# Patient Record
Sex: MALE | Race: WHITE | NOT HISPANIC OR LATINO | Employment: OTHER | URBAN - NONMETROPOLITAN AREA
[De-identification: names, ages, dates, MRNs, and addresses within clinical notes are randomized per-mention and may not be internally consistent; named-entity substitution may affect disease eponyms.]

---

## 2022-12-02 ENCOUNTER — TELEPHONE (OUTPATIENT)
Dept: CARDIOLOGY | Facility: CLINIC | Age: 62
End: 2022-12-02

## 2022-12-02 NOTE — TELEPHONE ENCOUNTER
For the HUB to read to pt:       LEFT VM TO CONFIRM APPT, IF PT CALLS BACK PLEASE PUT CALL THROUGH

## 2023-01-20 ENCOUNTER — TELEPHONE (OUTPATIENT)
Dept: CARDIOLOGY | Facility: CLINIC | Age: 63
End: 2023-01-20
Payer: MEDICARE

## 2023-01-20 NOTE — TELEPHONE ENCOUNTER
For the HUB to read to pt:       LEFT MESSAGE FOR PT TO CONFIRM APPT, IF PT CALLS BACK PLEASE PUT CALL THROUGH, THANK YOU

## 2023-02-28 ENCOUNTER — TELEPHONE (OUTPATIENT)
Dept: CARDIOLOGY | Facility: CLINIC | Age: 63
End: 2023-02-28
Payer: MEDICARE

## 2023-03-01 ENCOUNTER — OFFICE VISIT (OUTPATIENT)
Dept: CARDIOLOGY | Facility: CLINIC | Age: 63
End: 2023-03-01
Payer: MEDICARE

## 2023-03-01 VITALS
SYSTOLIC BLOOD PRESSURE: 143 MMHG | HEIGHT: 66 IN | OXYGEN SATURATION: 96 % | WEIGHT: 186 LBS | BODY MASS INDEX: 29.89 KG/M2 | DIASTOLIC BLOOD PRESSURE: 93 MMHG | HEART RATE: 79 BPM

## 2023-03-01 DIAGNOSIS — R29.818 NEUROLOGIC ABNORMALITY: ICD-10-CM

## 2023-03-01 DIAGNOSIS — R42 DIZZINESS: ICD-10-CM

## 2023-03-01 DIAGNOSIS — R06.02 SOB (SHORTNESS OF BREATH): Primary | ICD-10-CM

## 2023-03-01 DIAGNOSIS — R53.82 CHRONIC FATIGUE: ICD-10-CM

## 2023-03-01 DIAGNOSIS — R94.31 ABNORMAL EKG: ICD-10-CM

## 2023-03-01 DIAGNOSIS — R00.2 PALPITATIONS: ICD-10-CM

## 2023-03-01 PROCEDURE — 93000 ELECTROCARDIOGRAM COMPLETE: CPT | Performed by: NURSE PRACTITIONER

## 2023-03-01 PROCEDURE — 99204 OFFICE O/P NEW MOD 45 MIN: CPT | Performed by: NURSE PRACTITIONER

## 2023-03-01 RX ORDER — FLUOXETINE HYDROCHLORIDE 20 MG/1
1 CAPSULE ORAL DAILY
COMMUNITY
Start: 2022-12-21

## 2023-03-01 RX ORDER — METFORMIN HYDROCHLORIDE 500 MG/1
1 TABLET, EXTENDED RELEASE ORAL EVERY 12 HOURS SCHEDULED
COMMUNITY
Start: 2023-01-21

## 2023-03-01 RX ORDER — NEBIVOLOL 5 MG/1
1 TABLET ORAL DAILY
COMMUNITY
Start: 2023-01-21

## 2023-03-01 RX ORDER — NITROGLYCERIN 0.4 MG/1
TABLET SUBLINGUAL
Qty: 30 TABLET | Refills: 5 | Status: SHIPPED | OUTPATIENT
Start: 2023-03-01

## 2023-03-01 RX ORDER — ATORVASTATIN CALCIUM 10 MG/1
10 TABLET, FILM COATED ORAL DAILY
COMMUNITY

## 2023-03-01 NOTE — PROGRESS NOTES
Pablo Armstrong is a 62 y.o. male who presents to day for Shortness of Breath and Fatigue (Chronic ).    CHIEF COMPLIANT  Chief Complaint   Patient presents with   • Shortness of Breath   • Fatigue     Chronic        Active Problems:  Problem List Items Addressed This Visit    None  Visit Diagnoses     SOB (shortness of breath)    -  Primary    Relevant Medications    nitroglycerin (NITROSTAT) 0.4 MG SL tablet    Other Relevant Orders    ECG 12 Lead    Stress Test With Myocardial Perfusion One Day    Adult Transthoracic Echo Complete W/ Cont if Necessary Per Protocol    Duplex Carotid Ultrasound CAR    CT Head Without Contrast    Palpitations        Relevant Medications    nitroglycerin (NITROSTAT) 0.4 MG SL tablet    Other Relevant Orders    ECG 12 Lead    Stress Test With Myocardial Perfusion One Day    Adult Transthoracic Echo Complete W/ Cont if Necessary Per Protocol    Duplex Carotid Ultrasound CAR    CT Head Without Contrast    Chronic fatigue        Relevant Medications    nitroglycerin (NITROSTAT) 0.4 MG SL tablet    Other Relevant Orders    ECG 12 Lead    Stress Test With Myocardial Perfusion One Day    Adult Transthoracic Echo Complete W/ Cont if Necessary Per Protocol    Duplex Carotid Ultrasound CAR    CT Head Without Contrast    Neurologic abnormality        Relevant Medications    nitroglycerin (NITROSTAT) 0.4 MG SL tablet    Other Relevant Orders    ECG 12 Lead    Stress Test With Myocardial Perfusion One Day    Adult Transthoracic Echo Complete W/ Cont if Necessary Per Protocol    Duplex Carotid Ultrasound CAR    CT Head Without Contrast    Abnormal EKG        Relevant Medications    nitroglycerin (NITROSTAT) 0.4 MG SL tablet    Other Relevant Orders    ECG 12 Lead    Stress Test With Myocardial Perfusion One Day    Adult Transthoracic Echo Complete W/ Cont if Necessary Per Protocol    Duplex Carotid Ultrasound CAR    CT Head Without Contrast          HPI  HPI    Mr. Rahat Armstrong is a  62-year-old male who presents today to establish care for cardiac evaluation. He is accompanied by his wife.    His primary care provider, Jonah Ashley PA-C, completed blood work on 10/10/2022 which demonstrated an LDL of 155 mg/dL, triglycerides of 200 mg/dL, elevated blood glucose, and hemoglobin A1c of 7.0 percent. The patient was prescribed atorvastatin but is previously tolerating.  He is currently taking atorvastatin 10 mg daily and tolerating this well. The patient reports constant generalized soreness, which he attributes to arthritis, and he denies that his symptoms have worsened with atorvastatin. He denies undergoing repeat lipid panel since starting anti-hyperlipidemic medication. The patient's wife believes that he is scheduled to follow up with Jonah Ashley PA-C, in the near future.    The patient takes Bystolic for a history of tachycardia. He denies that his blood pressure was significantly elevated at that time. His blood pressure is elevated in the office today, 03/01/2023, which his wife attributes to the patient drinking alcohol on the evening of 02/28/2023. She states that he drinks alcohol excessively. The patient reports that at home, his blood pressure is approximately 128 mmHg systolic and 87 mmHg or ?(please verify 85 vs. 95 mmHg) diastolic.     The patient's wife reports that the patient has diabetes mellitus and does not follow a diabetic diet. She states that his blood glucose has generally been over 200 mg/dL recently. He notes that his blood glucose fluctuates and may be 140 mg/dL on some days and 240 mg/dL on other days.    The patient experiences dyspnea consistently with exertion and occasionally at rest. His dyspnea is exacerbated by activity such as utilizing a chainsaw or ascending 15 to 20 stairs. The patient denies orthopnea; however, his wife reports that the patient experiences orthopnea. He affirms that he would be able to ambulate on a treadmill.    The patient reports that  "he \"pulled a muscle\" approximately 2 weeks ago and is experiencing resultant left neck and left shoulder pain. He was initially unable to turn his head due to neck tightness. The patient notes that intermittent tingling of his left hand began approximately 2 weeks ago, which he attributes to \"tendinitis.\" He does not recall what he was doing at the onset of his left hand tingling. His left hand tingling is exacerbated by squeezing, such as carrying a paint bucket. The patient's wife states that the patient's left upper extremity has been \"going numb,\" though the patient indicates only his left hand is affected.    Approximately 1 month ago, the patient was leaning forward and straining to look under the hong of a Jeep at night, and he dropped a flashlight. He bent forward to  the flashlight and was unable to reach or grasp the flashlight in 5 attempts. He stood up, and his bilateral lower extremities \"wouldn't move.\" The patient states that his \"brain was telling them to move, but they wouldn't move.\" When he was able to move his bilateral lower extremities, he felt as though he was dragging a toe and thought he might fall. He ambulated approximately 10 feet and sat on a golf cart. His symptoms lasted for approximately 10 to 15 minutes. He denies attempting to speak during the episode. He denies unilateral symptoms. After the episode, he was able to call his wife. The patient suspects that he \"pinched a nerve\" upon bending forward. His wife questions if he suffered a transient ischemic attack and inquires about testing. His wife denies that the patient has followed up with Jonah Ashley PA-C, or undergone CT scan of the head since the episode.    The patient denies noticing significant palpitations regularly, though he has noted occasional palpitations in the past. His wife indicates that he has reported palpitations to her.    The patient denies chest pain, chest pressure, chest heaviness, chest tightness, " "and chest discomfort.The patient's wife states that he has osteoporosis and rheumatoid arthritis.         Approximately 10 years ago, the patient suffered a \"burning\" allergic reaction to DYE administered for \"some kind of scan.\" The patient suspects that he is allergic to DUST. He states that he has been wheezing recently which he attributes to \"ROCK DUST\" and \"TREE DUST.\"    His wife notes that in  he developed a rash, pruritus, and emesis. He visited a [facility], and an electrocardiogram was performed and \"good.\" The cause of the rash was unknown. He felt improved approximately 1 hour after emesis.    Today the patient's electrocardiogram demonstrated T wave inversion and mild ST depression in leads III and aVF.    The patient's wife notes that he is a smoker and inquires smoking cessation assistance. He tried Chantix in the past and experienced nightmares. He retired 1 to 2 years ago and returned to work part-time in .    The patient's wife reports that the patient's mother underwent open heart surgeries, stent placements, and  at age 83 years. Her cardiac issues were diagnosed after the age of 60 years. She had osteoporosis.    PRIOR MEDS  Current Outpatient Medications on File Prior to Visit   Medication Sig Dispense Refill   • atorvastatin (LIPITOR) 10 MG tablet Take 1 tablet by mouth Daily.     • FLUoxetine (PROzac) 20 MG capsule Take 1 capsule by mouth Daily.     • metFORMIN ER (GLUCOPHAGE-XR) 500 MG 24 hr tablet Take 1 tablet by mouth Every 12 (Twelve) Hours.     • nebivolol (BYSTOLIC) 5 MG tablet Take 1 tablet by mouth Daily.       No current facility-administered medications on file prior to visit.       ALLERGIES  Patient has no known allergies.    HISTORY  Past Medical History:   Diagnosis Date   • Arthritis    • Depression    • Diabetes (HCC)    • Diverticulitis    • Gout    • Osteoporosis        Social History     Socioeconomic History   • Marital status:    Tobacco Use   • " "Smoking status: Every Day     Packs/day: 1.00     Years: 48.00     Pack years: 48.00     Types: Cigarettes   • Smokeless tobacco: Never   Substance and Sexual Activity   • Alcohol use: Yes     Alcohol/week: 6.0 - 10.0 standard drinks     Types: 6 - 10 Cans of beer per week       Family History   Problem Relation Age of Onset   • Heart attack Mother    • Cancer Father        Review of Systems   Constitutional: Positive for fatigue. Negative for appetite change, chills and fever.   HENT: Positive for tinnitus. Negative for dental problem, drooling, ear discharge, ear pain, facial swelling, sinus pressure, sneezing and sore throat.    Eyes: Negative for pain, redness and visual disturbance.   Respiratory: Positive for shortness of breath and wheezing. Negative for cough and choking.    Cardiovascular: Positive for chest pain and palpitations. Negative for leg swelling.   Gastrointestinal: Negative for blood in stool, constipation, diarrhea, nausea and rectal pain.   Genitourinary: Negative.  Negative for difficulty urinating.   Musculoskeletal: Positive for arthralgias.   Skin: Negative for rash and wound.   Neurological: Positive for numbness. Negative for dizziness and weakness. Syncope: left hand goes numb.   Psychiatric/Behavioral: Positive for sleep disturbance.       Objective     VITALS: /93   Pulse 79   Ht 167.6 cm (66\")   Wt 84.4 kg (186 lb)   SpO2 96%   BMI 30.02 kg/m²     LABS:   Lab Results (most recent)     None          IMAGING:   No Images in the past 120 days found..    EXAM:  Physical Exam  Vitals and nursing note reviewed.   Constitutional:       Appearance: He is well-developed.   HENT:      Head: Normocephalic and atraumatic.   Eyes:      Pupils: Pupils are equal, round, and reactive to light.   Neck:      Vascular: No carotid bruit or JVD.   Cardiovascular:      Rate and Rhythm: Normal rate and regular rhythm.      Pulses:           Carotid pulses are 2+ on the right side and 2+ on the " left side.       Radial pulses are 2+ on the right side and 2+ on the left side.        Posterior tibial pulses are 2+ on the right side and 2+ on the left side.      Heart sounds: Normal heart sounds. No murmur heard.    No gallop.   Pulmonary:      Effort: Pulmonary effort is normal. No respiratory distress.      Breath sounds: Wheezing present.   Abdominal:      General: Bowel sounds are normal. There is no distension.      Palpations: Abdomen is soft.      Tenderness: There is no abdominal tenderness.   Musculoskeletal:         General: No swelling. Normal range of motion.      Cervical back: Neck supple.   Skin:     General: Skin is warm and dry.   Neurological:      Mental Status: He is alert and oriented to person, place, and time.      Cranial Nerves: No cranial nerve deficit.      Sensory: No sensory deficit.   Psychiatric:         Speech: Speech normal.         Behavior: Behavior normal.         Thought Content: Thought content normal.         Judgment: Judgment normal.         Procedure     ECG 12 Lead    Date/Time: 3/1/2023 2:00 PM  Performed by: Will Mercado APRN  Authorized by: Will Mercado APRN   Comparison: not compared with previous ECG   Previous ECG: no previous ECG available  Rhythm: sinus rhythm  Rate: normal  BPM: 75  T inversion: II and III  QRS axis: right    Clinical impression: abnormal EKG  Comments: QTc 417 ms                 Assessment & Plan    Diagnosis Plan   1. SOB (shortness of breath)  ECG 12 Lead    Stress Test With Myocardial Perfusion One Day    Adult Transthoracic Echo Complete W/ Cont if Necessary Per Protocol    Duplex Carotid Ultrasound CAR    nitroglycerin (NITROSTAT) 0.4 MG SL tablet    CT Head Without Contrast      2. Palpitations  ECG 12 Lead    Stress Test With Myocardial Perfusion One Day    Adult Transthoracic Echo Complete W/ Cont if Necessary Per Protocol    Duplex Carotid Ultrasound CAR    nitroglycerin (NITROSTAT) 0.4 MG SL tablet    CT Head Without  Contrast      3. Chronic fatigue  ECG 12 Lead    Stress Test With Myocardial Perfusion One Day    Adult Transthoracic Echo Complete W/ Cont if Necessary Per Protocol    Duplex Carotid Ultrasound CAR    nitroglycerin (NITROSTAT) 0.4 MG SL tablet    CT Head Without Contrast      4. Neurologic abnormality  ECG 12 Lead    Stress Test With Myocardial Perfusion One Day    Adult Transthoracic Echo Complete W/ Cont if Necessary Per Protocol    Duplex Carotid Ultrasound CAR    nitroglycerin (NITROSTAT) 0.4 MG SL tablet    CT Head Without Contrast      5. Abnormal EKG  ECG 12 Lead    Stress Test With Myocardial Perfusion One Day    Adult Transthoracic Echo Complete W/ Cont if Necessary Per Protocol    Duplex Carotid Ultrasound CAR    nitroglycerin (NITROSTAT) 0.4 MG SL tablet    CT Head Without Contrast          Return in about 3 months (around 6/1/2023), or if symptoms worsen or fail to improve.    Diagnoses and all orders for this visit:    1. SOB (shortness of breath) (Primary)  -     ECG 12 Lead  -     Stress Test With Myocardial Perfusion One Day; Future  -     Adult Transthoracic Echo Complete W/ Cont if Necessary Per Protocol; Future  -     Duplex Carotid Ultrasound CAR; Future  -     nitroglycerin (NITROSTAT) 0.4 MG SL tablet; 1 under the tongue as needed for angina, may repeat q5mins for up three doses  Dispense: 30 tablet; Refill: 5  -     CT Head Without Contrast; Future    2. Palpitations  -     ECG 12 Lead  -     Stress Test With Myocardial Perfusion One Day; Future  -     Adult Transthoracic Echo Complete W/ Cont if Necessary Per Protocol; Future  -     Duplex Carotid Ultrasound CAR; Future  -     nitroglycerin (NITROSTAT) 0.4 MG SL tablet; 1 under the tongue as needed for angina, may repeat q5mins for up three doses  Dispense: 30 tablet; Refill: 5  -     CT Head Without Contrast; Future    3. Chronic fatigue  -     ECG 12 Lead  -     Stress Test With Myocardial Perfusion One Day; Future  -     Adult  Transthoracic Echo Complete W/ Cont if Necessary Per Protocol; Future  -     Duplex Carotid Ultrasound CAR; Future  -     nitroglycerin (NITROSTAT) 0.4 MG SL tablet; 1 under the tongue as needed for angina, may repeat q5mins for up three doses  Dispense: 30 tablet; Refill: 5  -     CT Head Without Contrast; Future    4. Neurologic abnormality  -     ECG 12 Lead  -     Stress Test With Myocardial Perfusion One Day; Future  -     Adult Transthoracic Echo Complete W/ Cont if Necessary Per Protocol; Future  -     Duplex Carotid Ultrasound CAR; Future  -     nitroglycerin (NITROSTAT) 0.4 MG SL tablet; 1 under the tongue as needed for angina, may repeat q5mins for up three doses  Dispense: 30 tablet; Refill: 5  -     CT Head Without Contrast; Future    5. Abnormal EKG  -     ECG 12 Lead  -     Stress Test With Myocardial Perfusion One Day; Future  -     Adult Transthoracic Echo Complete W/ Cont if Necessary Per Protocol; Future  -     Duplex Carotid Ultrasound CAR; Future  -     nitroglycerin (NITROSTAT) 0.4 MG SL tablet; 1 under the tongue as needed for angina, may repeat q5mins for up three doses  Dispense: 30 tablet; Refill: 5  -     CT Head Without Contrast; Future    Plan  1. Dietary changes to address hyperlipidemia were discussed.  2. The patient's left neck and left shoulder pain and episode of bilateral lower extremity abnormality could be due to a musculoskeletal, neurologic, or cardiac issue.  And of the most concerning is his abnormal EKG with changes that could be consistent with ischemia in the inferior leads.  Expedited treadmill stress test with myocardial perfusion.  3.  Patient will also go under echocardiogram for further evaluation of his symptoms as well and to evaluate systolic and diastolic function and evaluate for hypertensive heart disease.  4.  Bilateral carotid artery duplex ultrasound, and CT scan of the head will be performed for further evaluation, and he will be contacted to discuss the  results and further followup. If his CT scan of the head is normal, brain MRI may be considered.  5. Smoking cessation aids including nicotine patches, nicotine gums, and medications such as Wellbutrin were discussed. He will contact the office if he is interested in trialing Wellbutrin.  6.  He will continue atorvastatin 10 mg daily for the time being. Depending upon the results of his next lipid panel, atorvastatin may be increased to 20 mg daily. If he does not have followup scheduled with Jonah Ashley PA-C, in the near future, lipid panel may be ordered by this practice. His LDL goal is 100 mg/dL or less.  7.  Patient's blood pressure is controlled on current blood pressure medication regimen despite elevated blood pressure today.  Reports blood pressure is normally about 128/87.  No medication changes are warranted at this time.  Patient advised to monitor blood pressure on a daily basis and report any persistent highs or lows.  Set goal blood pressure for patient at 130/80 or below.  6. As-needed nitroglycerin was prescribed for chest pain lasting longer than 1 minute. He was instructed to take nitroglycerin 1 dose every 5 minutes for up to 3 doses and to go to the emergency room if chest pain persists following 3 doses of nitroglycerin.  7. The patient was informed of the increased risk of anemia with diabetes mellitus.  8.  Informed of signs and symptoms of ACS and advised to seek emergent treatment for any new worsening symptoms.  Patient also advised sooner follow-up as needed.  Also advised to follow-up with family doctor as needed  This note is dictated utilizing voice recognition software.  Although this record has been proof read, transcriptional errors may still be present. If questions occur regarding the content of this record please do not hesitate to call our office.  I have reviewed and confirmed the accuracy of the ROS as documented by the MA/LPN/RN DENNY Casarez    Assessment  1.  Abnormal electrocardiogram  2. Hyperlipidemia  3. History of tachycardia  4. Palpitations   5. Dyspnea  6. Left neck and left shoulder pain, left hand paresthesia  7. Neurologic abnormality  8. Diabetes mellitus   9. Osteoporosis  10. Rheumatoid arthritis  11. Tobacco use  ?(please verify hypertension, patient denies, not listed in EHR)    Rahat rAmstrong  reports that he has been smoking cigarettes. He has a 48.00 pack-year smoking history. He has never used smokeless tobacco..          MEDS ORDERED DURING VISIT:  New Medications Ordered This Visit   Medications   • nitroglycerin (NITROSTAT) 0.4 MG SL tablet     Si under the tongue as needed for angina, may repeat q5mins for up three doses     Dispense:  30 tablet     Refill:  5           This document has been electronically signed by DENNY Casarez Jr.  2023 14:59 EST    Transcribed from ambient dictation for DENNY Casarez by Rufina Qiu.  23   17:27 EST    Patient or patient representative verbalized consent to the visit recording.  I have personally performed the services described in this document as transcribed by the above individual, and it is both accurate and complete.

## 2023-03-03 ENCOUNTER — HOSPITAL ENCOUNTER (OUTPATIENT)
Dept: CARDIOLOGY | Facility: HOSPITAL | Age: 63
Discharge: HOME | End: 2023-03-03
Payer: MEDICARE

## 2023-03-03 DIAGNOSIS — R29.818 NEUROLOGIC ABNORMALITY: ICD-10-CM

## 2023-03-03 DIAGNOSIS — R00.2 PALPITATIONS: ICD-10-CM

## 2023-03-03 DIAGNOSIS — R53.82 CHRONIC FATIGUE: ICD-10-CM

## 2023-03-03 DIAGNOSIS — R94.31 ABNORMAL EKG: ICD-10-CM

## 2023-03-03 DIAGNOSIS — R06.02 SOB (SHORTNESS OF BREATH): ICD-10-CM

## 2023-03-03 PROCEDURE — 93017 CV STRESS TEST TRACING ONLY: CPT

## 2023-03-03 PROCEDURE — 78452 HT MUSCLE IMAGE SPECT MULT: CPT | Performed by: INTERNAL MEDICINE

## 2023-03-03 PROCEDURE — A9500 TC99M SESTAMIBI: HCPCS | Performed by: INTERNAL MEDICINE

## 2023-03-03 PROCEDURE — 0 TECHNETIUM SESTAMIBI: Performed by: INTERNAL MEDICINE

## 2023-03-03 PROCEDURE — 25010000002 REGADENOSON 0.4 MG/5ML SOLUTION: Performed by: INTERNAL MEDICINE

## 2023-03-03 PROCEDURE — 93018 CV STRESS TEST I&R ONLY: CPT | Performed by: INTERNAL MEDICINE

## 2023-03-03 PROCEDURE — 78452 HT MUSCLE IMAGE SPECT MULT: CPT

## 2023-03-03 RX ADMIN — REGADENOSON 0.4 MG: 0.08 INJECTION, SOLUTION INTRAVENOUS at 13:01

## 2023-03-03 RX ADMIN — TECHNETIUM TC 99M SESTAMIBI 1 DOSE: 1 INJECTION INTRAVENOUS at 11:48

## 2023-03-03 RX ADMIN — TECHNETIUM TC 99M SESTAMIBI 1 DOSE: 1 INJECTION INTRAVENOUS at 13:01

## 2023-03-06 LAB
BH CV REST NUCLEAR ISOTOPE DOSE: 10 MCI
BH CV STRESS COMMENTS STAGE 1: NORMAL
BH CV STRESS DOSE REGADENOSON STAGE 1: 0.4
BH CV STRESS DURATION MIN STAGE 1: 0
BH CV STRESS DURATION SEC STAGE 1: 10
BH CV STRESS NUCLEAR ISOTOPE DOSE: 30 MCI
BH CV STRESS PROTOCOL 1: NORMAL
BH CV STRESS RECOVERY BP: NORMAL MMHG
BH CV STRESS RECOVERY HR: 83 BPM
BH CV STRESS STAGE 1: 1
MAXIMAL PREDICTED HEART RATE: 157 BPM
PERCENT MAX PREDICTED HR: 69.43 %
STRESS BASELINE BP: NORMAL MMHG
STRESS BASELINE HR: 77 BPM
STRESS PERCENT HR: 82 %
STRESS POST PEAK BP: NORMAL MMHG
STRESS POST PEAK HR: 109 BPM
STRESS TARGET HR: 133 BPM

## 2023-03-09 ENCOUNTER — TELEPHONE (OUTPATIENT)
Dept: CARDIOLOGY | Facility: CLINIC | Age: 63
End: 2023-03-09
Payer: MEDICARE

## 2023-03-09 NOTE — TELEPHONE ENCOUNTER
STRESS  Pt notified of no acute findings. Provider will discuss results at f/u. Pt reminded of appt date and time.  ----- Message from Georgie Rosenbaum MA sent at 3/7/2023  4:41 PM EST -----    ----- Message -----  From: Will Mercado APRN  Sent: 3/7/2023   3:44 PM EST  To: Georgie Rosenbaum MA    Patient was found to have a normal stress test with no evidence of ischemia.  Keep follow-up.

## 2023-03-28 ENCOUNTER — HOSPITAL ENCOUNTER (OUTPATIENT)
Dept: CARDIOLOGY | Facility: HOSPITAL | Age: 63
Discharge: HOME | End: 2023-03-28
Payer: MEDICARE

## 2023-03-28 VITALS — WEIGHT: 186.07 LBS | HEIGHT: 66 IN | BODY MASS INDEX: 29.9 KG/M2

## 2023-03-28 DIAGNOSIS — R53.82 CHRONIC FATIGUE: ICD-10-CM

## 2023-03-28 DIAGNOSIS — R94.31 ABNORMAL EKG: ICD-10-CM

## 2023-03-28 DIAGNOSIS — R00.2 PALPITATIONS: ICD-10-CM

## 2023-03-28 DIAGNOSIS — R06.02 SOB (SHORTNESS OF BREATH): ICD-10-CM

## 2023-03-28 DIAGNOSIS — R29.818 NEUROLOGIC ABNORMALITY: ICD-10-CM

## 2023-03-28 DIAGNOSIS — R42 DIZZINESS: ICD-10-CM

## 2023-03-28 LAB
AORTIC DIMENSIONLESS INDEX: 0.75 (DI)
BH CV ECHO MEAS - ACS: 1.97 CM
BH CV ECHO MEAS - AO MAX PG: 5.5 MMHG
BH CV ECHO MEAS - AO MEAN PG: 2.8 MMHG
BH CV ECHO MEAS - AO ROOT DIAM: 3.2 CM
BH CV ECHO MEAS - AO V2 MAX: 116.9 CM/SEC
BH CV ECHO MEAS - AO V2 VTI: 25.1 CM
BH CV ECHO MEAS - EDV(CUBED): 85.2 ML
BH CV ECHO MEAS - EF_3D-VOL: 57 %
BH CV ECHO MEAS - ESV(CUBED): 30.3 ML
BH CV ECHO MEAS - FS: 29.1 %
BH CV ECHO MEAS - IVS/LVPW: 0.91 CM
BH CV ECHO MEAS - IVSD: 1 CM
BH CV ECHO MEAS - LA DIMENSION: 3.6 CM
BH CV ECHO MEAS - LAT PEAK E' VEL: 5 CM/SEC
BH CV ECHO MEAS - LV MASS(C)D: 157.1 GRAMS
BH CV ECHO MEAS - LV MAX PG: 3.2 MMHG
BH CV ECHO MEAS - LV MEAN PG: 1.36 MMHG
BH CV ECHO MEAS - LV V1 MAX: 89 CM/SEC
BH CV ECHO MEAS - LV V1 VTI: 21.8 CM
BH CV ECHO MEAS - LVIDD: 4.4 CM
BH CV ECHO MEAS - LVIDS: 3.1 CM
BH CV ECHO MEAS - LVPWD: 1.09 CM
BH CV ECHO MEAS - MED PEAK E' VEL: 5.6 CM/SEC
BH CV ECHO MEAS - MV A MAX VEL: 79.6 CM/SEC
BH CV ECHO MEAS - MV DEC SLOPE: 323.9 CM/SEC2
BH CV ECHO MEAS - MV DEC TIME: 0.3 MSEC
BH CV ECHO MEAS - MV E MAX VEL: 72.9 CM/SEC
BH CV ECHO MEAS - MV E/A: 0.92
BH CV ECHO MEAS - MV MAX PG: 4 MMHG
BH CV ECHO MEAS - MV MEAN PG: 1.57 MMHG
BH CV ECHO MEAS - MV P1/2T: 68.1 MSEC
BH CV ECHO MEAS - MV V2 VTI: 27.5 CM
BH CV ECHO MEAS - MVA(P1/2T): 3.2 CM2
BH CV ECHO MEAS - PA V2 MAX: 88.9 CM/SEC
BH CV ECHO MEAS - RAP SYSTOLE: 8 MMHG
BH CV ECHO MEAS - RV MAX PG: 2.11 MMHG
BH CV ECHO MEAS - RV V1 MAX: 72.6 CM/SEC
BH CV ECHO MEAS - RV V1 VTI: 16.3 CM
BH CV ECHO MEAS - RVDD: 2.5 CM
BH CV ECHO MEAS - RVSP: 14.1 MMHG
BH CV ECHO MEAS - TAPSE (>1.6): 2.11 CM
BH CV ECHO MEAS - TR MAX PG: 6.1 MMHG
BH CV ECHO MEAS - TR MAX VEL: 123.6 CM/SEC
BH CV ECHO MEASUREMENTS AVERAGE E/E' RATIO: 13.75
BH CV XLRA - TDI S': 13 CM/SEC
BH CV XLRA MEAS LEFT DIST CCA EDV: -25 CM/SEC
BH CV XLRA MEAS LEFT DIST CCA PSV: -62.9 CM/SEC
BH CV XLRA MEAS LEFT DIST ICA EDV: -54.7 CM/SEC
BH CV XLRA MEAS LEFT DIST ICA PSV: -123.8 CM/SEC
BH CV XLRA MEAS LEFT ICA/CCA RATIO: 1.97
BH CV XLRA MEAS LEFT MID ICA EDV: -51.5 CM/SEC
BH CV XLRA MEAS LEFT MID ICA PSV: -123.8 CM/SEC
BH CV XLRA MEAS LEFT PROX CCA EDV: 25.8 CM/SEC
BH CV XLRA MEAS LEFT PROX CCA PSV: 85.2 CM/SEC
BH CV XLRA MEAS LEFT PROX ECA EDV: -23 CM/SEC
BH CV XLRA MEAS LEFT PROX ECA PSV: -115.2 CM/SEC
BH CV XLRA MEAS LEFT PROX ICA EDV: -34.1 CM/SEC
BH CV XLRA MEAS LEFT PROX ICA PSV: -76.8 CM/SEC
BH CV XLRA MEAS LEFT VERTEBRAL A EDV: -9.4 CM/SEC
BH CV XLRA MEAS LEFT VERTEBRAL A PSV: -33.8 CM/SEC
BH CV XLRA MEAS RIGHT DIST CCA EDV: -16.5 CM/SEC
BH CV XLRA MEAS RIGHT DIST CCA PSV: -59 CM/SEC
BH CV XLRA MEAS RIGHT DIST ICA EDV: -38.6 CM/SEC
BH CV XLRA MEAS RIGHT DIST ICA PSV: -101.5 CM/SEC
BH CV XLRA MEAS RIGHT ICA/CCA RATIO: 1.73
BH CV XLRA MEAS RIGHT MID ICA EDV: 29.2 CM/SEC
BH CV XLRA MEAS RIGHT MID ICA PSV: 90.4 CM/SEC
BH CV XLRA MEAS RIGHT PROX CCA EDV: 17.6 CM/SEC
BH CV XLRA MEAS RIGHT PROX CCA PSV: 74.4 CM/SEC
BH CV XLRA MEAS RIGHT PROX ECA EDV: -14.9 CM/SEC
BH CV XLRA MEAS RIGHT PROX ECA PSV: -104.2 CM/SEC
BH CV XLRA MEAS RIGHT PROX ICA EDV: -22.1 CM/SEC
BH CV XLRA MEAS RIGHT PROX ICA PSV: -69.5 CM/SEC
BH CV XLRA MEAS RIGHT VERTEBRAL A EDV: -21.6 CM/SEC
BH CV XLRA MEAS RIGHT VERTEBRAL A PSV: -96.3 CM/SEC
LV EF 2D ECHO EST: 56 %
MAXIMAL PREDICTED HEART RATE: 157 BPM
MAXIMAL PREDICTED HEART RATE: 157 BPM
SINUS: 3.2 CM
STRESS TARGET HR: 133 BPM
STRESS TARGET HR: 133 BPM

## 2023-03-28 PROCEDURE — 93306 TTE W/DOPPLER COMPLETE: CPT

## 2023-03-28 PROCEDURE — 93880 EXTRACRANIAL BILAT STUDY: CPT

## 2023-04-04 ENCOUNTER — TELEPHONE (OUTPATIENT)
Dept: CARDIOLOGY | Facility: CLINIC | Age: 63
End: 2023-04-04
Payer: MEDICARE

## 2023-04-04 NOTE — TELEPHONE ENCOUNTER
I have checked the chart and Echo and Carotid has not been resulted yet at this time.I called and left a message for patient on voicemail.      Julia OBANDO

## 2023-04-04 NOTE — TELEPHONE ENCOUNTER
Caller: SONIA ANTHONY    Relationship: Emergency Contact    Best call back number: [phone]    What test was performed: ECHO, CAROTID US    When was the test performed: 3.28.23    Where was the test performed: [facility]

## 2023-04-10 ENCOUNTER — TELEPHONE (OUTPATIENT)
Dept: CARDIOLOGY | Facility: CLINIC | Age: 63
End: 2023-04-10

## 2023-04-10 NOTE — TELEPHONE ENCOUNTER
Will Mercado, APRBEV   3/7/2023  3:44 PM EST       Patient was found to have a normal stress test with no evidence of ischemia.  Keep follow-up.         Tried to contact patient regarding stress test. Echo not read at this time. Left VM to return call.

## 2023-04-10 NOTE — TELEPHONE ENCOUNTER
Caller: SONIA ANTHONY    Relationship: Emergency Contact    Best call back number: [phone]    What was the call regarding: PTS WIFE CALLED IN ASKING FOR PTS TEST RESULTS.     Do you require a callback: YES

## 2023-04-24 ENCOUNTER — TELEPHONE (OUTPATIENT)
Dept: CARDIOLOGY | Facility: CLINIC | Age: 63
End: 2023-04-24
Payer: MEDICARE

## 2023-04-24 DIAGNOSIS — R93.89 ABNORMAL ULTRASOUND OF NECK: ICD-10-CM

## 2023-04-24 DIAGNOSIS — R42 DIZZINESS: ICD-10-CM

## 2023-04-24 DIAGNOSIS — I65.23 BILATERAL CAROTID ARTERY STENOSIS: ICD-10-CM

## 2023-04-24 DIAGNOSIS — R53.82 CHRONIC FATIGUE: Primary | ICD-10-CM

## 2023-04-25 RX ORDER — ASPIRIN 81 MG/1
81 TABLET ORAL DAILY
Qty: 90 TABLET | Refills: 3 | Status: SHIPPED | OUTPATIENT
Start: 2023-04-25

## 2023-04-25 NOTE — TELEPHONE ENCOUNTER
I see if we can get the CTA expedited to give us a better idea of what is going on.  I am not against from start 81 mg of aspirin.

## 2023-04-26 ENCOUNTER — TELEPHONE (OUTPATIENT)
Dept: CARDIOLOGY | Facility: CLINIC | Age: 63
End: 2023-04-26
Payer: MEDICARE

## 2023-04-26 NOTE — TELEPHONE ENCOUNTER
----- Message from DENNY Casarez sent at 4/23/2023 11:57 PM EDT -----  No hemodynamically significant carotid artery disease.  There is 50 to 69% in the mid left internal carotid.  If patient is continuing to have sent symptoms such as dizziness.  A CTA of the carotid arteries would be warranted for further evaluation o  f significant carotid artery disease

## 2023-04-26 NOTE — TELEPHONE ENCOUNTER
ECHO  Pt notified of no acute findings. Provider will discuss results at f/u. Pt reminded of appt date and time.  ----- Message from Georgie Rosenbaum MA sent at 4/25/2023  5:18 PM EDT -----    ----- Message -----  From: Will Mercado APRN  Sent: 4/23/2023  11:57 PM EDT  To: Georgie Rosenbaum MA    There is no acute findings on the echocardiogram.  Keep follow-up.

## 2023-04-26 NOTE — TELEPHONE ENCOUNTER
I tried to call patient to go over Duplex  Carotid .     No hemodynamically significant carotid artery disease.  There is 50 to 69% in the mid left internal carotid.  If patient is continuing to have sent symptoms such as dizziness.  A CTA of the carotid arteries would be warranted for further evaluation of significant carotid artery disease   The call was picked up then dropped . I will try to call again tomorrow.      Julia SANDSA

## 2023-04-27 ENCOUNTER — TELEPHONE (OUTPATIENT)
Dept: CARDIOLOGY | Facility: CLINIC | Age: 63
End: 2023-04-27
Payer: MEDICARE

## 2023-04-27 NOTE — TELEPHONE ENCOUNTER
I called and spoke with Margie patient wife who is listed on verbal release.No hemodynamically significant carotid artery disease.  There is 50 to 69% in the mid left internal carotid.  If patient is continuing to have sent symptoms such as dizziness.  A CTA of the carotid arteries would be warranted for further evaluation of significant carotid artery disease.    Patient is already having CTA today.     Julia OBANDO

## 2023-04-28 DIAGNOSIS — R53.82 CHRONIC FATIGUE: ICD-10-CM

## 2023-04-28 DIAGNOSIS — R42 DIZZINESS: ICD-10-CM

## 2023-04-28 DIAGNOSIS — R93.89 ABNORMAL ULTRASOUND OF NECK: ICD-10-CM

## 2023-04-28 DIAGNOSIS — I65.23 BILATERAL CAROTID ARTERY STENOSIS: ICD-10-CM

## 2023-05-02 ENCOUNTER — TELEPHONE (OUTPATIENT)
Dept: CARDIOLOGY | Facility: CLINIC | Age: 63
End: 2023-05-02
Payer: MEDICARE

## 2023-05-02 NOTE — TELEPHONE ENCOUNTER
Patient notified of results.     Will Mercado APRN Cheek, Laura Anne, MA  Caller: Unspecified (Today, 12:48 PM)  Patient's chronic CTA showed widely patent carotid arteries.  However he had abnormally small vertebrals.  Keep follow-up.       Patient request results/recommendations of carotid CTA.

## 2023-05-02 NOTE — TELEPHONE ENCOUNTER
Patient's chronic CTA showed widely patent carotid arteries.  However he had abnormally small vertebrals.  Keep follow-up.

## 2023-05-03 ENCOUNTER — TELEPHONE (OUTPATIENT)
Dept: CARDIOLOGY | Facility: CLINIC | Age: 63
End: 2023-05-03
Payer: MEDICARE

## 2023-05-03 NOTE — TELEPHONE ENCOUNTER
I called and went over CTA Carotid. I advised Carotids are widely patent however small vertebral arteries. Keep regular follow up . Patient had CT of the head 05/2/23 @ [facility]. I advised we have not received those at this time.      Julia OBANDO

## 2023-05-03 NOTE — TELEPHONE ENCOUNTER
----- Message from DENNY Casarez sent at 5/2/2023 12:54 PM EDT -----  Carotid arteries are widely patent.  However very small vertebral arteries.  Keep follow-up.

## 2023-05-08 DIAGNOSIS — R00.2 PALPITATIONS: ICD-10-CM

## 2023-05-08 DIAGNOSIS — R29.818 NEUROLOGIC ABNORMALITY: ICD-10-CM

## 2023-05-08 DIAGNOSIS — R53.82 CHRONIC FATIGUE: ICD-10-CM

## 2023-05-08 DIAGNOSIS — R06.02 SOB (SHORTNESS OF BREATH): ICD-10-CM

## 2023-05-08 DIAGNOSIS — R94.31 ABNORMAL EKG: ICD-10-CM

## 2023-05-10 ENCOUNTER — TELEPHONE (OUTPATIENT)
Dept: CARDIOLOGY | Facility: CLINIC | Age: 63
End: 2023-05-10
Payer: MEDICARE

## 2023-05-10 NOTE — TELEPHONE ENCOUNTER
CT HEAD  Pt notified of no acute findings. Provider will discuss results at f/u. Pt reminded of appt date and time.  ----- Message from Georgie Rosenbaum MA sent at 5/9/2023  9:58 AM EDT -----    ----- Message -----  From: Will Mercado APRN  Sent: 5/9/2023   9:43 AM EDT  To: Georgie Rosenbaum MA    Normal CT of the head.  Keep follow-up.  ----- Message -----  From: Milagro Condon RegSched Rep  Sent: 5/9/2023   9:34 AM EDT  To: DENNY Casarez

## 2023-06-19 ENCOUNTER — OFFICE VISIT (OUTPATIENT)
Dept: CARDIOLOGY | Facility: CLINIC | Age: 63
End: 2023-06-19
Payer: MEDICARE

## 2023-06-19 VITALS
HEART RATE: 81 BPM | BODY MASS INDEX: 28.99 KG/M2 | HEIGHT: 66 IN | WEIGHT: 180.4 LBS | SYSTOLIC BLOOD PRESSURE: 134 MMHG | DIASTOLIC BLOOD PRESSURE: 90 MMHG | OXYGEN SATURATION: 96 %

## 2023-06-19 DIAGNOSIS — R42 DIZZINESS: ICD-10-CM

## 2023-06-19 DIAGNOSIS — R53.82 CHRONIC FATIGUE: Primary | ICD-10-CM

## 2023-06-19 DIAGNOSIS — R06.02 SOB (SHORTNESS OF BREATH): ICD-10-CM

## 2023-06-19 DIAGNOSIS — R00.2 PALPITATIONS: ICD-10-CM

## 2023-06-19 PROCEDURE — 1159F MED LIST DOCD IN RCRD: CPT | Performed by: NURSE PRACTITIONER

## 2023-06-19 PROCEDURE — 1160F RVW MEDS BY RX/DR IN RCRD: CPT | Performed by: NURSE PRACTITIONER

## 2023-06-19 PROCEDURE — 99214 OFFICE O/P EST MOD 30 MIN: CPT | Performed by: NURSE PRACTITIONER

## 2023-06-19 RX ORDER — BUPROPION HYDROCHLORIDE 150 MG/1
1 TABLET ORAL DAILY
COMMUNITY
Start: 2023-06-06

## 2023-06-19 NOTE — PROGRESS NOTES
Subjective     Rahat Armstrong is a 62 y.o. male who presents to day for Fatigue (3 month f/u, CT, Echo, Carotid, Stress test and lab results).    CHIEF COMPLIANT  Chief Complaint   Patient presents with   • Fatigue     3 month f/u, CT, Echo, Carotid, Stress test and lab results       Active Problems:  Problem List Items Addressed This Visit    None  Visit Diagnoses     Chronic fatigue    -  Primary    Palpitations        Dizziness        SOB (shortness of breath)              HPI  HPI  Rahat Armstrong is a 62-year-old male patient being followed up today for testing results. Patient did go under a carotid duplex that showed no hemodynamically significant carotid artery disease this was followed up by carotid artery CTA due to the fact that the ultrasound indicated potentially 50 to 69% in the carotid arteries.  However the CTA did not show any evidence of any hemodynamically significant carotid artery disease.. He also went under a nuclear stress test that was negative for ischemia and a preserved post stress ejection fraction of 63 percent. His echocardiogram identified an EF of 60 to 65 percent, grade 1A diastolic dysfunction, trivial MR and TR.     He does have a history of chronic arterial hypertension, which his blood pressure is 134/90 mmHg today. He is on nebivolol. He reports he did not take his blood pressure medication last night or this morning.     He also has hyperlipidemia in which he is on atorvastatin 10 mg daily and antiplatelet therapy with aspirin 81 mg daily.    The patient reports he was unable to complete his treadmill stress test due to hypertension.  They had to convert him to a pharmaceutical stress test.  His blood pressure was 243/101 mmHg. His blood pressure was 166/97 mmHg prior to starting his stress test.    He denies lower extremity edema.    He states his fingers tingle constantly; however, he believes his symptoms could be associated with carpal tunnel. He states his fingers will  occasionally catch and feel sore.    The patient inquires if he needs to take medication for the blockages in his carotid arteries. He denies dizziness.    He reports he continues to experience weakness in his hands and legs, mainly his left leg. He states he has arthritis. He has an upcoming appointment with Jonah Ashley PA-C. He states he wrapped his hand with a tape wrap for 4 to 5 days, which seemed to help.    He states Jonah Ashley PA-C ordered blood work.  PRIOR MEDS  Current Outpatient Medications on File Prior to Visit   Medication Sig Dispense Refill   • aspirin 81 MG EC tablet Take 1 tablet by mouth Daily. 90 tablet 3   • atorvastatin (LIPITOR) 10 MG tablet Take 1 tablet by mouth Daily.     • buPROPion XL (WELLBUTRIN XL) 150 MG 24 hr tablet Take 1 tablet by mouth Daily.     • FLUoxetine (PROzac) 20 MG capsule Take 1 capsule by mouth Daily.     • metFORMIN ER (GLUCOPHAGE-XR) 500 MG 24 hr tablet Take 1 tablet by mouth Every 12 (Twelve) Hours.     • nebivolol (BYSTOLIC) 5 MG tablet Take 1 tablet by mouth Daily.     • nitroglycerin (NITROSTAT) 0.4 MG SL tablet 1 under the tongue as needed for angina, may repeat q5mins for up three doses 30 tablet 5     No current facility-administered medications on file prior to visit.       ALLERGIES  Patient has no known allergies.    HISTORY  Past Medical History:   Diagnosis Date   • Arthritis    • Depression    • Diabetes    • Diverticulitis    • Gout    • Osteoporosis        Social History     Socioeconomic History   • Marital status:    Tobacco Use   • Smoking status: Every Day     Packs/day: 1.00     Years: 48.00     Pack years: 48.00     Types: Cigarettes   • Smokeless tobacco: Never   Substance and Sexual Activity   • Alcohol use: Yes     Alcohol/week: 6.0 - 10.0 standard drinks     Types: 6 - 10 Cans of beer per week       Family History   Problem Relation Age of Onset   • Heart attack Mother    • Cancer Father        Review of Systems   Constitutional:   "Positive for fatigue. Negative for chills, diaphoresis and fever.   HENT:          Runny nose   Eyes:  Visual disturbance: blurry vision occas..   Respiratory:  Positive for apnea (unable to tolerate cpap machine), cough and wheezing. Negative for chest tightness and shortness of breath.    Cardiovascular: Negative.  Negative for chest pain, palpitations and leg swelling.   Gastrointestinal: Negative.  Negative for blood in stool.   Endocrine: Negative.    Genitourinary: Negative.  Negative for hematuria.   Musculoskeletal:  Positive for arthralgias, myalgias, neck pain (occas.) and neck stiffness. Negative for back pain.   Skin: Negative.    Allergic/Immunologic: Negative.  Negative for environmental allergies and food allergies.   Neurological:  Positive for numbness (fingers). Negative for dizziness, syncope, weakness, light-headedness and headaches.   Hematological:  Does not bruise/bleed easily.   Psychiatric/Behavioral:  Positive for sleep disturbance (restless).      Objective     VITALS: /90 (BP Location: Left arm, Patient Position: Sitting)   Pulse 81   Ht 167 cm (65.75\")   Wt 81.8 kg (180 lb 6.4 oz)   SpO2 96%   BMI 29.34 kg/m²     LABS:   Lab Results (most recent)       None            IMAGING:   No Images in the past 120 days found..    EXAM:  Physical Exam  Vitals and nursing note reviewed.   Constitutional:       Appearance: He is well-developed.   HENT:      Head: Normocephalic and atraumatic.   Eyes:      Pupils: Pupils are equal, round, and reactive to light.   Neck:      Thyroid: No thyroid mass.      Vascular: No carotid bruit or JVD.      Trachea: Trachea and phonation normal.   Cardiovascular:      Rate and Rhythm: Normal rate and regular rhythm.      Pulses:           Carotid pulses are 2+ on the right side and 2+ on the left side.       Radial pulses are 2+ on the right side and 2+ on the left side.        Posterior tibial pulses are 2+ on the right side and 2+ on the left side.     "  Heart sounds: Normal heart sounds. No murmur heard.    No friction rub. No gallop.   Pulmonary:      Effort: Pulmonary effort is normal. No respiratory distress.      Breath sounds: Normal breath sounds. No wheezing or rales.   Abdominal:      General: Bowel sounds are normal. There is no distension.      Palpations: Abdomen is soft.      Tenderness: There is no abdominal tenderness.   Musculoskeletal:         General: No swelling. Normal range of motion.      Cervical back: Neck supple.   Skin:     General: Skin is warm and dry.      Capillary Refill: Capillary refill takes less than 2 seconds.      Findings: No rash.   Neurological:      Mental Status: He is alert and oriented to person, place, and time.      Cranial Nerves: No cranial nerve deficit.      Sensory: No sensory deficit.   Psychiatric:         Speech: Speech normal.         Behavior: Behavior normal.         Thought Content: Thought content normal.         Judgment: Judgment normal.       Procedure   Procedures    No procedures were completed today.    Assessment & Plan    Diagnosis Plan   1. Chronic fatigue        2. Palpitations        3. Dizziness        4. SOB (shortness of breath)        PLAN    1. The patient's recent cardiac testing results were discussed in full detail during this visit.  All questions were answered.  We did discuss the carotid duplex, CTA of the carotids, stress test, and echocardiogram  2. The patient's medication regimen was reviewed and discussed with him in full detail.  3.  Patient's blood pressure is controlled on current blood pressure medication regimen despite elevated blood pressure today.  He reported that he has not taken his medications yet.  Patient advised to monitor blood pressure on a daily basis and report any persistent highs or lows.  Set goal blood pressure for patient at 130/80 or below.  4.  Overall patient seems to be stable with a decrease in palpitations and shortness of breath.  We will continue to  monitor.  5.  Informed of signs and symptoms of ACS and advised to seek emergent treatment for any new worsening symptoms.  Patient also advised sooner follow-up as needed.  Also advised to follow-up with family doctor as needed  This note is dictated utilizing voice recognition software.  Although this record has been proof read, transcriptional errors may still be present. If questions occur regarding the content of this record please do not hesitate to call our office.  I have reviewed and confirmed the accuracy of the ROS as documented by the MA/LPN/RN DENNY Casarez    Assessment  1.Hypertension   2. Hyperlipidemia    Return in about 6 months (around 12/19/2023), or if symptoms worsen or fail to improve.    Diagnoses and all orders for this visit:    1. Chronic fatigue (Primary)    2. Palpitations    3. Dizziness    4. SOB (shortness of breath)        Rahat Armstrong  reports that he has been smoking cigarettes. He has a 48.00 pack-year smoking history. He has never used smokeless tobacco.. I have educated him on the risk of diseases from using tobacco products such as cancer, COPD, and heart disease.     I advised him to quit and he is willing to quit. We have discussed the following method/s for tobacco cessation:  Education Material.     I spent 3  minutes counseling the patient.         MEDS ORDERED DURING VISIT:  No orders of the defined types were placed in this encounter.        This document has been electronically signed by DENNY Casarez Jr.  June 19, 2023 23:15 EDT      Transcribed from ambient dictation for DENNY Casarez by Parrish Espinoza.  06/19/23   16:48 EDT    Patient or patient representative verbalized consent to the visit recording.  I have personally performed the services described in this document as transcribed by the above individual, and it is both accurate and complete.